# Patient Record
Sex: MALE | Race: WHITE | Employment: OTHER | URBAN - METROPOLITAN AREA
[De-identification: names, ages, dates, MRNs, and addresses within clinical notes are randomized per-mention and may not be internally consistent; named-entity substitution may affect disease eponyms.]

---

## 2017-01-06 DIAGNOSIS — E03.9 ACQUIRED HYPOTHYROIDISM: Primary | ICD-10-CM

## 2017-01-09 RX ORDER — LEVOTHYROXINE SODIUM 150 UG/1
TABLET ORAL
Qty: 90 TABLET | Refills: 1 | Status: SHIPPED | OUTPATIENT
Start: 2017-01-09 | End: 2017-07-05

## 2017-01-09 NOTE — TELEPHONE ENCOUNTER
LEVOTHYROXINE 0.150MG (150MCG) TAB    Last Written Prescription Date: 01/12/2016  Last Quantity: 90, # refills: 1  Last Office Visit with [facility], [facility] or [facility] prescribing provider: 04/07/2016        TSH   Date Value Ref Range Status   04/01/2016 0.59 0.40 - 4.00 mU/L Final

## 2017-01-09 NOTE — TELEPHONE ENCOUNTER
Prescription approved per [facility] Refill Protocol.    Jeane Hernadnez RN

## 2017-05-15 ENCOUNTER — OFFICE VISIT (OUTPATIENT)
Dept: INTERNAL MEDICINE | Facility: CLINIC | Age: 62
End: 2017-05-15
Payer: COMMERCIAL

## 2017-05-15 VITALS
BODY MASS INDEX: 29.7 KG/M2 | HEART RATE: 80 BPM | HEIGHT: 68 IN | WEIGHT: 196 LBS | DIASTOLIC BLOOD PRESSURE: 62 MMHG | SYSTOLIC BLOOD PRESSURE: 132 MMHG | TEMPERATURE: 97.2 F | OXYGEN SATURATION: 97 %

## 2017-05-15 DIAGNOSIS — I77.1 SUBCLAVIAN ARTERY STENOSIS, LEFT (H): Primary | ICD-10-CM

## 2017-05-15 DIAGNOSIS — Z71.89 ADVANCED DIRECTIVES, COUNSELING/DISCUSSION: ICD-10-CM

## 2017-05-15 DIAGNOSIS — M54.2 CERVICALGIA: ICD-10-CM

## 2017-05-15 PROCEDURE — 99213 OFFICE O/P EST LOW 20 MIN: CPT | Performed by: INTERNAL MEDICINE

## 2017-05-15 ASSESSMENT — PAIN SCALES - GENERAL: PAINLEVEL: NO PAIN (0)

## 2017-05-15 NOTE — MR AVS SNAPSHOT
"              After Visit Summary   5/15/2017    Sony Johnson    MRN: [id number]           Patient Information     Date Of Birth          1955        Visit Information        Provider Department      5/15/2017 1:20 PM Parth Echeverria DO [facility]        Today's Diagnoses     Subclavian artery stenosis, left (H)    -  1    Cervicalgia        Advanced directives, counseling/discussion           Follow-ups after your visit        Who to contact     If you have questions or need follow up information about today's clinic visit or your schedule please contact [facility] directly at [phone].  Normal or non-critical lab and imaging results will be communicated to you by Spiced Bitshart, letter or phone within 4 business days after the clinic has received the results. If you do not hear from us within 7 days, please contact the clinic through Spiced Bitshart or phone. If you have a critical or abnormal lab result, we will notify you by phone as soon as possible.  Submit refill requests through Think[employer] or call your pharmacy and they will forward the refill request to us. Please allow 3 business days for your refill to be completed.          Additional Information About Your Visit        MyChart Information     Thinkature lets you send messages to your doctor, view your test results, renew your prescriptions, schedule appointments and more. To sign up, go to [email or web address] . Click on \"Log in\" on the left side of the screen, which will take you to the Welcome page. Then click on \"Sign up Now\" on the right side of the page.     You will be asked to enter the access code listed below, as well as some personal information. Please follow the directions to create your username and password.     Your access code is: [email or web address]  Expires: 2017  1:58 PM     Your access code will  in 90 days. If you need help or a new code, please call your [facility] or [phone].   " "     Care EveryWhere ID     This is your Care EveryWhere ID. This could be used by other organizations to access your [place] medical records  [id number]        Your Vitals Were     Pulse Temperature Height Pulse Oximetry BMI (Body Mass Index)       80 97.2  F (36.2  C) (Temporal) 5' 8\" (1.727 m) 97% 29.8 kg/m2        Blood Pressure from Last 3 Encounters:   05/15/17 132/62   04/07/16 (!) 88/62   04/01/16 90/52    Weight from Last 3 Encounters:   05/15/17 196 lb (88.9 kg)   04/07/16 202 lb 6.4 oz (91.8 kg)   04/01/16 199 lb (90.3 kg)              Today, you had the following     No orders found for display       Primary Care Provider Office Phone # Fax #    Ruperto Courtney [phone]       [place] [id number] [place] [address]        Thank you!     Thank you for choosing [facility]  for your care. Our goal is always to provide you with excellent care. Hearing back from our patients is one way we can continue to improve our services. Please take a few minutes to complete the written survey that you may receive in the mail after your visit with us. Thank you!             Your Updated Medication List - Protect others around you: Learn how to safely use, store and throw away your medicines at www.disposemymeds.org.          This list is accurate as of: 5/15/17  1:58 PM.  Always use your most recent med list.                   Brand Name Dispense Instructions for use    ASPIRIN PO      Take 81 mg by mouth daily       levothyroxine 150 MCG tablet    SYNTHROID/LEVOTHROID    90 tablet    TAKE 1 TABLET BY MOUTH DAILY         "

## 2017-05-15 NOTE — PROGRESS NOTES
SUBJECTIVE:                                                    Sony Johnson is a 61 year old male who presents to clinic today for the following health issues:    Chief Complaint   Patient presents with     Dizziness     f/u         CHIEF COMPLAINT:    The patient is a pleasant 61-year-old gentleman who I saw last year. He notes that he has been doing quite well since then but has recently developed some intermittent vertigo symptoms with some tingling on the side of his tongue and some mild facial paresthesias on the left. He states that he's had these in the past and generally does self manipulation of his neck to make it go away. He states that this works so well that he has required no healthcare. He does have a history of subclavian artery syndrome and does follow by both neurology and vascular surgery. He does not wish to take any medications and have explained that there really is no medication make this better. He states that he is currently symptom-free but it does come and go depending upon his level of self (chiropractic) manipulation. We have discussed the benefits of seeing an actual professional chiropractor and the safety involved with this as well. Given his history of vascular disease, I don't think it's a good idea for him to just go randomly cranking on his neck. He does agree.                         PAST, FAMILY,SOCIAL HISTORY:     Medical  History:   has no past medical history on file.     Surgical History:   has no past surgical history on file.     Social History:   reports that he has been smoking Cigarettes.  He has a 11.00 pack-year smoking history. He has never used smokeless tobacco. He reports that he does not drink alcohol or use illicit drugs.     Family History:  family history is not on file.            MEDICATIONS  Current Outpatient Prescriptions   Medication Sig Dispense Refill     levothyroxine (SYNTHROID/LEVOTHROID) 150 MCG tablet TAKE 1 TABLET BY MOUTH DAILY 90 tablet 1  "    ASPIRIN PO Take 81 mg by mouth daily           --------------------------------------------------------------------------------------------------------------------                          REVIEW OF SYSTEMS:         LUNGS: Pt denies: cough,excess sputum, hemoptysis, or shortness of breath.   HEART: Pt denies: chest pain, arrythmia, syncope, tachy or bradyarrhythmia or excess edema.   GI: Pt denies: nausea, vomitting, diarrhea, constipation, melena, or hematochezia.   NEURO: Pt denies: seizures, strokes, diplopia, weakness, paraesthesias, or paralysis. Does occasionally have vertigo which is not present at this time. Also has occasional paresthesias on the left side of the face and tingling on the left side of the tongue.                          EXAMINATION:         /62 (BP Location: Right arm, Patient Position: Chair, Cuff Size: Adult Regular)  Pulse 80  Temp 97.2  F (36.2  C) (Temporal)  Ht 5' 8\" (1.727 m)  Wt 196 lb (88.9 kg)  SpO2 97%  BMI 29.8 kg/m2   Constitutional: The patient appears to be in no acute distress. The patient appears to be adequately hydrated. No acute respiratory or hemodynamic distress is noted at this time.   LUNGS: clear bilaterally, airflow is brisk, no intercostal retraction or stridor is noted. No coughing is noted during visit.   HEART:  regular without rubs, clicks, gallops, or murmurs. PMI is nondisplaced. Upstrokes are brisk. S1,S2 are heard.   GI: Abdomen is soft, without rebound, guarding or tenderness. Bowel sounds are appropriate. No renal bruits are heard.    NEURO: Pt is alert and appropriate. No neurologic lateralization is noted. Cranial nerves 2-12 are intact. Peripheral sensory and motor function are grossly normal                        DECISION MAKIN. Subclavian artery stenosis, left (H)  Follow-up with vascular surgery as scheduled    2. Cervicalgia  Recommend chiropractic evaluation  Professional card given to patient to schedule " appointment    3. Advanced directives, counseling/discussion  Discussed briefly. Patient not interested                               FOLLOW UP    I have asked the patient to make an appointment for follow up with me for full physical examination in the near future.        I have carefully explained the diagnosis and treatment options with the patient. The patient has displayed an understanding of the above, and all subsequent questions were answered.         DO JULES Garcia    Portions of this note were produced using [employer]  Although every attempt at real-time proof reading has been made, occasional grammar/syntax errors may have been missed.

## 2017-05-15 NOTE — NURSING NOTE
"Chief Complaint   Patient presents with     Dizziness     f/u       Initial /62 (BP Location: Right arm, Patient Position: Chair, Cuff Size: Adult Regular)  Pulse 80  Temp 97.2  F (36.2  C) (Temporal)  Ht 5' 8\" (1.727 m)  Wt 196 lb (88.9 kg)  SpO2 97%  BMI 29.8 kg/m2 Estimated body mass index is 29.8 kg/(m^2) as calculated from the following:    Height as of this encounter: 5' 8\" (1.727 m).    Weight as of this encounter: 196 lb (88.9 kg).  Medication Reconciliation: complete   Health Maintenance reviewed at today's visit patient asked to schedule/complete:   Colon Cancer:  Patient reports already performed at [facility] on 9/2008   Peggy TYSON  '    "

## 2017-07-29 ENCOUNTER — TELEPHONE (OUTPATIENT)
Dept: FAMILY MEDICINE | Facility: CLINIC | Age: 62
End: 2017-07-29

## 2017-07-29 DIAGNOSIS — E03.9 ACQUIRED HYPOTHYROIDISM: ICD-10-CM

## 2017-08-01 RX ORDER — LEVOTHYROXINE SODIUM 150 UG/1
TABLET ORAL
Qty: 30 TABLET | Refills: 0 | Status: SHIPPED | OUTPATIENT
Start: 2017-08-01 | End: 2017-08-06

## 2017-08-04 DIAGNOSIS — E03.9 ACQUIRED HYPOTHYROIDISM: ICD-10-CM

## 2017-08-04 LAB — TSH SERPL DL<=0.005 MIU/L-ACNC: 0.11 MU/L (ref 0.4–4)

## 2017-08-04 PROCEDURE — 36415 COLL VENOUS BLD VENIPUNCTURE: CPT | Performed by: INTERNAL MEDICINE

## 2017-08-04 PROCEDURE — 84443 ASSAY THYROID STIM HORMONE: CPT | Performed by: INTERNAL MEDICINE

## 2017-08-06 RX ORDER — LEVOTHYROXINE SODIUM 137 UG/1
137 TABLET ORAL DAILY
Qty: 90 TABLET | Refills: 0 | Status: SHIPPED | OUTPATIENT
Start: 2017-08-06 | End: 2017-12-04

## 2017-08-07 NOTE — PROGRESS NOTES
Please contact the patient and notify him of the following:  The thyroid appears to be a little high.  Would recommend decreasing the dose of Synthroid to 137 mg at the next refill.    Thank you.  DO JULES Garcia

## 2017-09-09 DIAGNOSIS — E03.9 ACQUIRED HYPOTHYROIDISM: ICD-10-CM

## 2017-09-11 NOTE — TELEPHONE ENCOUNTER
Levothyroxine     Last Written Prescription Date: 8/6/17  Last Quantity: 90, # refills: 0  Last Office Visit with G, P or [facility] prescribing provider: 5/15/17        TSH   Date Value Ref Range Status   08/04/2017 0.11 (L) 0.40 - 4.00 mU/L Final

## 2017-09-12 NOTE — TELEPHONE ENCOUNTER
Routing refill request to provider for review/approval because:  Labs out of range:  TSH.     PCP is currently out of office, will route to covering provider.     Jeane Hernandez RN

## 2017-09-14 RX ORDER — LEVOTHYROXINE SODIUM 150 UG/1
TABLET ORAL
Qty: 30 TABLET | Refills: 0 | OUTPATIENT
Start: 2017-09-14

## 2017-11-09 ENCOUNTER — OFFICE VISIT (OUTPATIENT)
Dept: INTERNAL MEDICINE | Facility: CLINIC | Age: 62
End: 2017-11-09
Payer: COMMERCIAL

## 2017-11-09 VITALS
OXYGEN SATURATION: 98 % | HEART RATE: 60 BPM | BODY MASS INDEX: 30.87 KG/M2 | WEIGHT: 203 LBS | TEMPERATURE: 97.2 F | DIASTOLIC BLOOD PRESSURE: 62 MMHG | SYSTOLIC BLOOD PRESSURE: 112 MMHG

## 2017-11-09 DIAGNOSIS — R51.9 TEMPORAL PAIN: Primary | ICD-10-CM

## 2017-11-09 LAB — ERYTHROCYTE [SEDIMENTATION RATE] IN BLOOD BY WESTERGREN METHOD: 6 MM/H (ref 0–20)

## 2017-11-09 PROCEDURE — 36415 COLL VENOUS BLD VENIPUNCTURE: CPT | Performed by: INTERNAL MEDICINE

## 2017-11-09 PROCEDURE — 85652 RBC SED RATE AUTOMATED: CPT | Performed by: INTERNAL MEDICINE

## 2017-11-09 PROCEDURE — 99213 OFFICE O/P EST LOW 20 MIN: CPT | Performed by: INTERNAL MEDICINE

## 2017-11-09 RX ORDER — GABAPENTIN 300 MG/1
300 CAPSULE ORAL 3 TIMES DAILY
Qty: 14 CAPSULE | Refills: 0 | Status: SHIPPED | OUTPATIENT
Start: 2017-11-09 | End: 2017-11-15

## 2017-11-09 ASSESSMENT — PAIN SCALES - GENERAL: PAINLEVEL: NO PAIN (1)

## 2017-11-09 NOTE — MR AVS SNAPSHOT
"              After Visit Summary   11/9/2017    Sony Johnson    MRN: [id number]           Patient Information     Date Of Birth          1955        Visit Information        Provider Department      11/9/2017 2:20 PM Parth Echeverria DO [facility]        Today's Diagnoses     Temporal pain    -  1       Follow-ups after your visit        Your next 10 appointments already scheduled     Nov 30, 2017  2:20 PM CST   Office Visit with Parth Echeverria DO   [facility]    [address] [place] [address] [id number] [phone]           Bring a current list of meds and any records pertaining to this visit. For Physicals, please bring immunization records and any forms needing to be filled out. Please arrive 10 minutes early to complete paperwork.              Who to contact     If you have questions or need follow up information about today's clinic visit or your schedule please contact [facility] directly at [phone].  Normal or non-critical lab and imaging results will be communicated to you by Circle of Momshart, letter or phone within 4 business days after the clinic has received the results. If you do not hear from us within 7 days, please contact the clinic through [facility] or phone. If you have a critical or abnormal lab result, we will notify you by phone as soon as possible.  Submit refill requests through [employer] or call your pharmacy and they will forward the refill request to us. Please allow 3 business days for your refill to be completed.          Additional Information About Your Visit        Circle of Momshar[employer] Savvy Information     [employer] lets you send messages to your doctor, view your test results, renew your prescriptions, schedule appointments and more. To sign up, go to [email or web address] . Click on \"Log in\" on the left side of the screen, which will take you to the Welcome page. Then click on \"Sign up Now\" on " the right side of the page.     You will be asked to enter the access code listed below, as well as some personal information. Please follow the directions to create your username and password.     Your access code is: [email or web address]  Expires: 2018  4:11 PM     Your access code will  in 90 days. If you need help or a new code, please call your [facility] or [phone].        Care EveryWhere ID     This is your Care EveryWhere ID. This could be used by other organizations to access your [place] medical records  [id number]        Your Vitals Were     Pulse Temperature Pulse Oximetry BMI (Body Mass Index)          60 97.2  F (36.2  C) (Temporal) 98% 30.87 kg/m2         Blood Pressure from Last 3 Encounters:   17 112/62   05/15/17 132/62   16 (!) 88/62    Weight from Last 3 Encounters:   17 203 lb (92.1 kg)   05/15/17 196 lb (88.9 kg)   16 202 lb 6.4 oz (91.8 kg)              We Performed the Following     ESR: Erythrocyte sedimentation rate          Today's Medication Changes          These changes are accurate as of: 17 11:59 PM.  If you have any questions, ask your nurse or doctor.               Start taking these medicines.        Dose/Directions    gabapentin 300 MG capsule   Commonly known as:  NEURONTIN   Started by:  Parth Echeverria DO        Dose:  300 mg   Take 1 capsule (300 mg) by mouth 3 times daily   Quantity:  14 capsule   Refills:  0            Where to get your medicines      These medications were sent to [facility] [address] [address] [facility] [address]  [address] [facility], [place] [facility] [address]     Phone:  [phone]     gabapentin 300 MG capsule                Primary Care Provider Office Phone # Fax #    Parth Echeverria [phone] [address] [place] [address]        Equal Access to Services     [facility] SHELBI AH: juan Hay  martha cam waxngoc naeanuja hernandezwendy juan. So [facility] [phone].    ATENCIÓN: Si nancy mahoney, tiene a bonilla disposición servicios gratuitos de asistencia lingüística. Llame al [phone].    We comply with applicable federal civil rights laws and [place] laws. We do not discriminate on the basis of race, color, national origin, age, disability, sex, sexual orientation, or gender identity.            Thank you!     Thank you for choosing [facility]  for your care. Our goal is always to provide you with excellent care. Hearing back from our patients is one way we can continue to improve our services. Please take a few minutes to complete the written survey that you may receive in the mail after your visit with us. Thank you!             Your Updated Medication List - Protect others around you: Learn how to safely use, store and throw away your medicines at www.disposemymeds.org.          This list is accurate as of: 11/9/17 11:59 PM.  Always use your most recent med list.                   Brand Name Dispense Instructions for use Diagnosis    ASPIRIN PO      Take 81 mg by mouth daily        gabapentin 300 MG capsule    NEURONTIN    14 capsule    Take 1 capsule (300 mg) by mouth 3 times daily        levothyroxine 137 MCG tablet    SYNTHROID/LEVOTHROID    90 tablet    Take 1 tablet (137 mcg) by mouth daily    Acquired hypothyroidism

## 2017-11-09 NOTE — PROGRESS NOTES
Chief Complaint   Patient presents with     Headache     CHIEF COMPLAINT:    The patient is a pleasant 61-year-old gentleman who presents today complaining of headache. He's had this off and on now for the last couple weeks. It is a minimal superficial headache over the right temporal area with occasional sharp stabbing twinges.these only last about a minute or so and evidently resolved. He's had no associated visual changes, fevers or chills. He notes no trauma to the head. He notes no neurologic sequelae including memory loss etc. Does have a history of hypothyroidism and takes his medication compliantly.                         PAST, FAMILY,SOCIAL HISTORY:     Medical  History:   has no past medical history on file.     Surgical History:   has no past surgical history on file.     Social History:   reports that he has been smoking Cigarettes.  He has a 11.00 pack-year smoking history. He has never used smokeless tobacco. He reports that he does not drink alcohol or use illicit drugs.     Family History:  family history is not on file.            MEDICATIONS  Current Outpatient Prescriptions   Medication Sig Dispense Refill     gabapentin (NEURONTIN) 300 MG capsule Take 1 capsule (300 mg) by mouth 3 times daily 14 capsule 0     levothyroxine (SYNTHROID/LEVOTHROID) 137 MCG tablet Take 1 tablet (137 mcg) by mouth daily 90 tablet 0     ASPIRIN PO Take 81 mg by mouth daily           --------------------------------------------------------------------------------------------------------------------                          REVIEW OF SYSTEMS:         LUNGS: Pt denies: cough,excess sputum, hemoptysis, or shortness of breath.   HEART: Pt denies: chest pain, arrythmia, syncope, tachy or bradyarrhythmia or excess edema.   GI: Pt denies: nausea, vomitting, diarrhea, constipation, melena, or hematochezia.   NEURO: Pt denies: seizures, strokes, diplopia, weakness, paraesthesias, or paralysis.does have some mild hyperesthesia  over the right temporal area as well.   SKIN: Pt denies: itching, rashes, discoloration, or specific lesions of concern. Denies recent hair loss.   EYES: Pt denies: double vision, blurred vision, scotomas, or eye pain. Denies any loss of vision in the right eye                          EXAMINATION:         /62 (BP Location: Left arm, Patient Position: Chair, Cuff Size: Adult Regular)  Pulse 60  Temp 97.2  F (36.2  C) (Temporal)  Wt 203 lb (92.1 kg)  SpO2 98%  BMI 30.87 kg/m2   LUNGS: clear bilaterally, airflow is brisk, no intercostal retraction or stridor is noted. No coughing is noted during visit.   HEART:  regular without rubs, clicks, gallops, or murmurs. PMI is nondisplaced. Upstrokes are brisk. S1,S2 are heard. There is no ropiness or fullness to the temporal artery on palpation.   GI: Abdomen is soft, without rebound, guarding or tenderness. Bowel sounds are appropriate. No renal bruits are heard.    NEURO: Pt is alert and appropriate. No neurologic lateralization is noted. Cranial nerves 2-12 are intact. Peripheral sensory and motor function are grossly normal.ssome hyperesthesia over the right temporal area is noted.   EYES: Pupils are equal, round and reactive. No significant ptosis, conjunctivitis, or inflammation. Fundal exam is grossly normal. Vision is grossly intact bilaterally.  Recommend yearly eye exams with appropriate occular professional.                        DECISION MAKIN. Temporal pain  Rule out temporal arteritis versus mild trigeminal neuritis/neuralgia  - ESR: Erythrocyte sedimentation rate  Will start gabapentin at 300 mg moving up toward 3 times daily    If sedimentation rate comes back elevated, will be starting steroid burst with ultimate taper and set up for temporal artery biopsy.                               FOLLOW UP    I have asked the patient to make an appointment for follow up with me in 1-2 weeks pending upon re        I have carefully explained the  diagnosis and treatment options with the patient. The patient has displayed an understanding of the above, and all subsequent questions were answered.         DO JULES Garcia    Portions of this note were produced using Ex[employer]bit  Although every attempt at real-time proof reading has been made, occasional grammar/syntax errors may have been missed.

## 2017-11-09 NOTE — NURSING NOTE
"Chief Complaint   Patient presents with     Headache       Initial /62 (BP Location: Left arm, Patient Position: Chair, Cuff Size: Adult Regular)  Pulse 60  Temp 97.2  F (36.2  C) (Temporal)  Wt 203 lb (92.1 kg)  SpO2 98%  BMI 30.87 kg/m2 Estimated body mass index is 30.87 kg/(m^2) as calculated from the following:    Height as of 5/15/17: 5' 8\" (1.727 m).    Weight as of this encounter: 203 lb (92.1 kg).  Medication Reconciliation: complete  Peggy EASONA    "

## 2017-11-15 ENCOUNTER — TELEPHONE (OUTPATIENT)
Dept: FAMILY MEDICINE | Facility: CLINIC | Age: 62
End: 2017-11-15

## 2017-11-15 DIAGNOSIS — G50.8 TRIGEMINAL NEURITIS: Primary | ICD-10-CM

## 2017-11-15 RX ORDER — GABAPENTIN 300 MG/1
CAPSULE ORAL
Qty: 120 CAPSULE | Refills: 0 | Status: SHIPPED | OUTPATIENT
Start: 2017-11-15 | End: 2018-03-14

## 2017-11-15 NOTE — TELEPHONE ENCOUNTER
----- Message from Parth Echeverria DO sent at 11/15/2017  7:55 AM CST -----    Please contact the patient and notify him of the following:  The sedimentation rate is 6 suggesting minimal inflammation.    Thank you.  DO HEYDI GarciaOI

## 2017-11-15 NOTE — PROGRESS NOTES
Please contact the patient and notify him of the following:  The sedimentation rate is 6 suggesting minimal inflammation.    Thank you.  DO JULES Garcia

## 2017-11-15 NOTE — TELEPHONE ENCOUNTER
Patient returned call. I relayed results message below and he has further questions. Please call him back at your earliest convenience to address.     Thank you  Leland Patel  Patient Representative

## 2017-11-15 NOTE — TELEPHONE ENCOUNTER
The normal sedimentation rate would suggest that we are not looking at a picture of temporal arteritis. Given the superficial neuritis type symptoms that he is having on his right temporal area, I would recommend continue with the gabapentin. We can increase the dosage to 300 mg in the morning, 300 mg at noon, and 600 mg at bedtime. I will call over a prescription to the [facility] pharmacy in [place].    I would like to have him set up a follow-up appointment in about 2 weeks.    Jacki

## 2017-11-15 NOTE — TELEPHONE ENCOUNTER
Pt is questioning what his next step is. Pt advised this would be review by Dr. Echeverria and he would be a call back. Pt was in agreement.

## 2017-11-30 ENCOUNTER — OFFICE VISIT (OUTPATIENT)
Dept: INTERNAL MEDICINE | Facility: CLINIC | Age: 62
End: 2017-11-30
Payer: COMMERCIAL

## 2017-11-30 VITALS
DIASTOLIC BLOOD PRESSURE: 62 MMHG | TEMPERATURE: 96.8 F | HEART RATE: 88 BPM | SYSTOLIC BLOOD PRESSURE: 102 MMHG | OXYGEN SATURATION: 97 % | BODY MASS INDEX: 30.56 KG/M2 | WEIGHT: 201 LBS

## 2017-11-30 DIAGNOSIS — G50.0 TRIGEMINAL NEURALGIA: Primary | ICD-10-CM

## 2017-11-30 PROCEDURE — 99213 OFFICE O/P EST LOW 20 MIN: CPT | Performed by: INTERNAL MEDICINE

## 2017-11-30 ASSESSMENT — PAIN SCALES - GENERAL: PAINLEVEL: NO PAIN (0)

## 2017-11-30 NOTE — PROGRESS NOTES
Chief Complaint   Patient presents with     RECHECK                    Chief Complaint    The patient is a pleasant 62-year-old gentleman who recently had some temporal discomfort.  He was started on gabapentin and sedimentation rate was performed to rule out temporal arteritis.  Sed rate returned as normal and he has had near complete resolution of his discomfort with the use of the gabapentin.  He is having no side effects from the medication.                       PAST, FAMILY,SOCIAL HISTORY:     Medical  History:   has no past medical history on file.     Surgical History:   has no past surgical history on file.     Social History:   reports that he has been smoking Cigarettes.  He has a 11.00 pack-year smoking history. He has never used smokeless tobacco. He reports that he does not drink alcohol or use illicit drugs.     Family History:  family history is not on file.            MEDICATIONS  Current Outpatient Prescriptions   Medication Sig Dispense Refill     gabapentin (NEURONTIN) 300 MG capsule 1 capsule in the morning, 1 capsule at noon, and 2 capsules at bedtime. 120 capsule 0     ASPIRIN PO Take 81 mg by mouth daily       levothyroxine (SYNTHROID/LEVOTHROID) 137 MCG tablet TAKE 1 TABLET(137 MCG) BY MOUTH DAILY 90 tablet 1         --------------------------------------------------------------------------------------------------------------------                              Review of Systems     LUNGS: Pt denies: cough,excess sputum, hemoptysis, or shortness of breath.   HEART: Pt denies: chest pain, arrythmia, syncope, tachy or bradyarrhythmia.   GI: Pt denies: nausea, vomitting, diarrhea, constipation, melena, or hematochezia.   NEURO: Pt denies: seizures, strokes, diplopia, weakness, paraesthesias, or paralysis.  Marked improvement in the superficial temporal pain along the right side of his right temporal area is noted.                                     Examination  /62 (BP Location: Right  arm, Patient Position: Chair, Cuff Size: Adult Regular)  Pulse 88  Temp 96.8  F (36  C) (Temporal)  Wt 201 lb (91.2 kg)  SpO2 97%  BMI 30.56 kg/m2   Constitutional: The patient appears to be in no acute distress. The patient appears to be adequately hydrated. No acute respiratory or hemodynamic distress is noted at this time.   LUNGS: clear bilaterally, airflow is brisk, no intercostal retraction or stridor is noted. No coughing is noted during visit.   HEART:  regular without rubs, clicks, gallops, or murmurs. PMI is nondisplaced. Upstrokes are brisk. S1,S2 are heard.   GI: Abdomen is soft, without rebound, guarding or tenderness. Bowel sounds are appropriate. No renal bruits are heard.   SKIN:  warm and dry. No erythema, or rashes are noted. No specific lesions of concern are noted.  No evidence of zoster is noted.                                        Decision Making      1. Trigeminal neuralgia  Recommend continuing the gabapentin for a month and then slowly tapering off of it.  I suspect this trigeminal neuralgia involving the first branch of the trigeminal nerve will resolve spontaneously.                        FOLLOW UP   I have asked the patient to make an appointment for followup with me as needed        I have carefully explained the diagnosis and treatment options to the patient.  The patient has displayed an understanding of the above, and all subsequent questions were answered.      DO JULES Garcia    Portions of this note were produced using Instant[employer]  Although every attempt at real-time proof reading has been made, occasional grammar/syntax errors may have been missed.

## 2017-11-30 NOTE — MR AVS SNAPSHOT
"              After Visit Summary   2017    Sony Johnson    MRN: [id number]           Patient Information     Date Of Birth          1955        Visit Information        Provider Department      2017 2:20 PM Parth Echeverria DO [facility]        Today's Diagnoses     Trigeminal neuralgia    -  1       Follow-ups after your visit        Who to contact     If you have questions or need follow up information about today's clinic visit or your schedule please contact [facility] directly at [phone].  Normal or non-critical lab and imaging results will be communicated to you by [employer], letter or phone within 4 business days after the clinic has received the results. If you do not hear from us within 7 days, please contact the clinic through [employer] or phone. If you have a critical or abnormal lab result, we will notify you by phone as soon as possible.  Submit refill requests through [facility] or call your pharmacy and they will forward the refill request to us. Please allow 3 business days for your refill to be completed.          Additional Information About Your Visit        MyChart Information     [facility] lets you send messages to your doctor, view your test results, renew your prescriptions, schedule appointments and more. To sign up, go to [email or web address] . Click on \"Log in\" on the left side of the screen, which will take you to the Welcome page. Then click on \"Sign up Now\" on the right side of the page.     You will be asked to enter the access code listed below, as well as some personal information. Please follow the directions to create your username and password.     Your access code is: [email or web address]  Expires: 2018  4:11 PM     Your access code will  in 90 days. If you need help or a new code, please call your [facility] or [phone].        Care EveryWhere ID     This is your Care EveryWhere ID. This could be used by " other organizations to access your [facility] medical records  [id number]        Your Vitals Were     Pulse Temperature Pulse Oximetry BMI (Body Mass Index)          88 96.8  F (36  C) (Temporal) 97% 30.56 kg/m2         Blood Pressure from Last 3 Encounters:   11/30/17 102/62   11/09/17 112/62   05/15/17 132/62    Weight from Last 3 Encounters:   11/30/17 201 lb (91.2 kg)   11/09/17 203 lb (92.1 kg)   05/15/17 196 lb (88.9 kg)              Today, you had the following     No orders found for display       Primary Care Provider Office Phone # Fax #    Parth Jhonatan Echeverria,  [phone]       [address] [place] [address]        Equal Access to Services     REI MAIRO : Hadii aad ku hadashguanakito Sokaterin, waaxda luqadaha, qaybta kaalmada adeegyada, kalyan payan . So [facility] [phone].    ATENCIÓN: Si habla español, tiene a bonilla disposición servicios gratuitos de asistencia lingüística. Llame al [phone].    We comply with applicable federal civil rights laws and [place] laws. We do not discriminate on the basis of race, color, national origin, age, disability, sex, sexual orientation, or gender identity.            Thank you!     Thank you for choosing [facility]  for your care. Our goal is always to provide you with excellent care. Hearing back from our patients is one way we can continue to improve our services. Please take a few minutes to complete the written survey that you may receive in the mail after your visit with us. Thank you!             Your Updated Medication List - Protect others around you: Learn how to safely use, store and throw away your medicines at www.disposemymeds.org.          This list is accurate as of: 11/30/17 11:59 PM.  Always use your most recent med list.                   Brand Name Dispense Instructions for use Diagnosis    ASPIRIN PO      Take 81 mg by mouth daily        gabapentin 300 MG capsule    NEURONTIN     120 capsule    1 capsule in the morning, 1 capsule at noon, and 2 capsules at bedtime.    Trigeminal neuritis

## 2017-11-30 NOTE — NURSING NOTE
"Chief Complaint   Patient presents with     RECHECK       Initial /62 (BP Location: Right arm, Patient Position: Chair, Cuff Size: Adult Regular)  Pulse 88  Temp 96.8  F (36  C) (Temporal)  Wt 201 lb (91.2 kg)  SpO2 97%  BMI 30.56 kg/m2 Estimated body mass index is 30.56 kg/(m^2) as calculated from the following:    Height as of 5/15/17: 5' 8\" (1.727 m).    Weight as of this encounter: 201 lb (91.2 kg).  Medication Reconciliation: complete  Peggy TYSON    "

## 2017-12-04 ENCOUNTER — TELEPHONE (OUTPATIENT)
Dept: LAB | Facility: CLINIC | Age: 62
End: 2017-12-04

## 2017-12-04 DIAGNOSIS — E03.9 ACQUIRED HYPOTHYROIDISM: ICD-10-CM

## 2017-12-04 NOTE — TELEPHONE ENCOUNTER
Requested Prescriptions   Pending Prescriptions Disp Refills     levothyroxine (SYNTHROID/LEVOTHROID) 137 MCG tablet [Pharmacy Med Name: LEVOTHYROXINE 0.137MG (137MCG) TAB] 90 tablet 0     Sig: TAKE 1 TABLET(137 MCG) BY MOUTH DAILY    Thyroid Protocol Failed    12/4/2017  2:32 PM       Failed - Normal TSH on file in past 12 months    Recent Labs   Lab Test  08/04/17   1228   TSH  0.11*             Passed - Patient is 12 years or older       Passed - Recent or future visit with authorizing provider's specialty    Patient had office visit in the last year or has a visit in the next 30 days with authorizing provider.  See chart review.

## 2017-12-05 RX ORDER — LEVOTHYROXINE SODIUM 137 UG/1
TABLET ORAL
Qty: 90 TABLET | Refills: 1 | Status: SHIPPED | OUTPATIENT
Start: 2017-12-05 | End: 2018-06-04

## 2017-12-05 NOTE — TELEPHONE ENCOUNTER
Routing refill request to provider for review/approval because:  Labs out of range:  TSH    Diane Irvin, RN  [facility]

## 2017-12-07 NOTE — TELEPHONE ENCOUNTER
"Patient called and requested to speak with PCP, patient was advised a message could be sent and then patient proceeded to state he \"doesn't understand why there is a hold on his medication as he needs to take one every dam day of his life.\"  Patient went on and on and said \"he'd just dig a hole and roll into as that's obviously what everyone here wants him to do\", then patient hung up before I could get any words out.    Thank you,  Mindy Mcneal  Patient Representative    "

## 2018-03-14 ENCOUNTER — APPOINTMENT (OUTPATIENT)
Dept: GENERAL RADIOLOGY | Facility: CLINIC | Age: 63
End: 2018-03-14
Attending: EMERGENCY MEDICINE
Payer: COMMERCIAL

## 2018-03-14 ENCOUNTER — HOSPITAL ENCOUNTER (EMERGENCY)
Facility: CLINIC | Age: 63
Discharge: HOME | End: 2018-03-14
Attending: EMERGENCY MEDICINE | Admitting: EMERGENCY MEDICINE
Payer: COMMERCIAL

## 2018-03-14 ENCOUNTER — NURSE TRIAGE (OUTPATIENT)
Dept: NURSING | Facility: CLINIC | Age: 63
End: 2018-03-14

## 2018-03-14 VITALS
SYSTOLIC BLOOD PRESSURE: 125 MMHG | DIASTOLIC BLOOD PRESSURE: 78 MMHG | TEMPERATURE: 96.8 F | RESPIRATION RATE: 16 BRPM | OXYGEN SATURATION: 100 %

## 2018-03-14 DIAGNOSIS — Z72.0 TOBACCO ABUSE: ICD-10-CM

## 2018-03-14 DIAGNOSIS — J11.1 INFLUENZA-LIKE ILLNESS: ICD-10-CM

## 2018-03-14 PROCEDURE — 71046 X-RAY EXAM CHEST 2 VIEWS: CPT | Mod: TC

## 2018-03-14 PROCEDURE — 93010 ELECTROCARDIOGRAM REPORT: CPT | Mod: Z6 | Performed by: EMERGENCY MEDICINE

## 2018-03-14 PROCEDURE — 99284 EMERGENCY DEPT VISIT MOD MDM: CPT | Mod: 25 | Performed by: EMERGENCY MEDICINE

## 2018-03-14 PROCEDURE — 93005 ELECTROCARDIOGRAM TRACING: CPT | Performed by: EMERGENCY MEDICINE

## 2018-03-14 PROCEDURE — 99285 EMERGENCY DEPT VISIT HI MDM: CPT | Mod: 25 | Performed by: EMERGENCY MEDICINE

## 2018-03-14 RX ORDER — ALBUTEROL SULFATE 90 UG/1
2 AEROSOL, METERED RESPIRATORY (INHALATION) EVERY 6 HOURS
Qty: 1 INHALER | Refills: 0 | Status: SHIPPED | OUTPATIENT
Start: 2018-03-14 | End: 2018-03-24

## 2018-03-14 RX ORDER — BENZONATATE 200 MG/1
200 CAPSULE ORAL 3 TIMES DAILY PRN
Qty: 20 CAPSULE | Refills: 0 | Status: SHIPPED | OUTPATIENT
Start: 2018-03-14 | End: 2020-01-25

## 2018-03-14 NOTE — ED AVS SNAPSHOT
[facility] Emergency Department    [address] [place] [address]    Phone:  [phone]    Fax:  [phone]                                       Sony Johnson   MRN: [id number]    Department:  [facility] Emergency Department   Date of Visit:  3/14/2018           Patient Information     Date Of Birth          1955        Your diagnoses for this visit were:     Influenza-like illness     Tobacco abuse        You were seen by Jesus Mancia MD.      Follow-up Information     Follow up with Parth Echeverria DO.    Specialty:  Internal Medicine    Why:  As needed    Contact information:    [address] [place] [id number] [phone]        Discharge References/Attachments     SMOKING CESSATION (ENGLISH)    (INFLUENZA), THE FLU (ENGLISH)      24 Hour Appointment Hotline       To make an appointment at any [facility], call [phone] ([phone]). If you don't have a family doctor or clinic, we will help you find one. [facility] clinics are conveniently located to serve the needs of you and your family.             Review of your medicines      START taking        Dose / Directions Last dose taken    albuterol 108 (90 BASE) MCG/ACT Inhaler   Commonly known as:  PROAIR HFA/PROVENTIL HFA/VENTOLIN HFA   Dose:  2 puff   Quantity:  1 Inhaler        Inhale 2 puffs into the lungs every 6 hours for 10 days   Refills:  0        benzonatate 200 MG capsule   Commonly known as:  TESSALON   Dose:  200 mg   Quantity:  20 capsule        Take 1 capsule (200 mg) by mouth 3 times daily as needed for cough   Refills:  0          Our records show that you are taking the medicines listed below. If these are incorrect, please call your family doctor or clinic.        Dose / Directions Last dose taken    ASPIRIN PO   Dose:  81 mg        Take 81 mg by mouth daily   Refills:  0        levothyroxine 137 MCG tablet   Commonly known as:  SYNTHROID/LEVOTHROID   Quantity:  90 tablet  "       TAKE 1 TABLET(137 MCG) BY MOUTH DAILY   Refills:  1                Prescriptions were sent or printed at these locations (2 Prescriptions)                   [facility], [place] - [address]   [address] [facility] [address]    Telephone:  [phone]   Fax:  [phone]   Hours:                  E-Prescribed (2 of 2)         benzonatate (TESSALON) 200 MG capsule               albuterol (PROAIR HFA/PROVENTIL HFA/VENTOLIN HFA) 108 (90 BASE) MCG/ACT Inhaler                Procedures and tests performed during your visit     EKG 12-lead, tracing only    XR Chest 2 Views      Orders Needing Specimen Collection     None      Pending Results     No orders found from 3/12/2018 to 3/15/2018.            Pending Culture Results     No orders found from 3/12/2018 to 3/15/2018.            Pending Results Instructions     If you had any lab results that were not finalized at the time of your Discharge, you can call the ED Lab Result RN at [phone]. You will be contacted by this team for any positive Lab results or changes in treatment. The nurses are available 7 days a week from 10A to 6:30P.  You can leave a message 24 hours per day and they will return your call.        Thank you for choosing [facility]       Thank you for choosing [facility] for your care. Our goal is always to provide you with excellent care. Hearing back from our patients is one way we can continue to improve our services. Please take a few minutes to complete the written survey that you may receive in the mail after you visit with us. Thank you!        [facility] Information     [facility] lets you send messages to your doctor, view your test results, renew your prescriptions, schedule appointments and more. To sign up, go to [email or web address] . Click on \"Log in\" on the left side of the screen, which will take you to the Welcome page. Then click on \"Sign up Now\" on the right side of the page.     You will be " asked to enter the access code listed below, as well as some personal information. Please follow the directions to create your username and password.     Your access code is: [email or web address]  Expires: 2018  3:55 PM     Your access code will  in 90 days. If you need help or a new code, please call your [facility] clinic or [phone].        Care EveryWhere ID     This is your Care EveryWhere ID. This could be used by other organizations to access your [facility] medical records  [id number]        Equal Access to Services     [facility] : Verito Williamson, juan cam, martha león, kalyan payan . So [facility] [phone].    ATENCIÓN: Si habla español, tiene a bonilla disposición servicios gratuitos de asistencia lingüística. David al [phone].    We comply with applicable federal civil rights laws and [place] laws. We do not discriminate on the basis of race, color, national origin, age, disability, sex, sexual orientation, or gender identity.            After Visit Summary       This is your record. Keep this with you and show to your community pharmacist(s) and doctor(s) at your next visit.

## 2018-03-14 NOTE — ED AVS SNAPSHOT
[facility] Emergency Department    [address] [place] [address]    Phone:  [phone]    Fax:  [phone]                                       Sony Johnson   MRN: [id number]    Department:  [facility] Emergency Department   Date of Visit:  3/14/2018           After Visit Summary Signature Page     I have received my discharge instructions, and my questions have been answered. I have discussed any challenges I see with this plan with the nurse or doctor.    ..........................................................................................................................................  Patient/Patient Representative Signature      ..........................................................................................................................................  Patient Representative Print Name and Relationship to Patient    ..................................................               ................................................  Date                                            Time    ..........................................................................................................................................  Reviewed by Signature/Title    ...................................................              ..............................................  Date                                                            Time

## 2018-03-14 NOTE — TELEPHONE ENCOUNTER
Reason for Disposition    Chest pain  (Exception: MILD central chest pain, present only when coughing)    Additional Information    Negative: Severe difficulty breathing (e.g., struggling for each breath, speaks in single words)    Negative: Bluish lips, tongue, or face now    Negative: Shock suspected (e.g., cold/pale/clammy skin, too weak to stand, low BP, rapid pulse)    Negative: Sounds like a life-threatening emergency to the triager    Negative: Severe sore throat    Negative: [1] Doesn't match the criteria for Influenza AND [2] sounds like a cold    Negative: Influenza vaccine reaction is suspected    Protocols used: INFLUENZA - SEASONAL-ADULT-AH    He has someone who can bring him to the ER for evaluation. He has body aches, coughing hurts in the chest with deep breaths.  He did not get a flu shot.  Yue Esteban RN-[facility]

## 2018-03-14 NOTE — ED PROVIDER NOTES
History     Chief Complaint   Patient presents with     Flu Symptoms     HPI  Sony Johnson is a 62 year old male who presents with 4 days of upper respiratory symptoms including congestion, nonproductive cough, fever, chills and body aches.  Patient states he feels so cold he wraps himself up in a blanket and then starts to sweat.  He then takes a blanket often gets chilled again.  He has had harsh nonproductive cough and complains of lower chest and upper abdominal pain due to coughing.  Pain does not radiate to his back.  His nausea or vomiting.  He has had no change in his stooling pattern.  No leg pain or swelling.  No history of DVT or PE.  Claims history of pneumonia as a child.  Is a daily smoker.  Denies history of COPD, eczema or asthma.  Did not receive influenza immunization this year.  No treatment for symptoms prior to arrival.  No known exposure to influenza.  No recent travel.  Denies personal cardiac history.  Patient wears daily compressive stockings to prevent pooling of blood in his lower extremities as he stands at work all day.    Problem List:    Patient Active Problem List    Diagnosis Date Noted     Advanced directives, counseling/discussion 05/15/2017     Priority: Medium     Info given       Hypothyroidism 02/11/2015     Priority: Medium     CARDIOVASCULAR SCREENING; LDL GOAL LESS THAN 130 02/11/2015     Priority: Medium        Past Medical History:    History reviewed. No pertinent past medical history.    Past Surgical History:    History reviewed. No pertinent surgical history.    Family History:    No family history on file.    Social History:  Marital Status:   [2]  Social History   Substance Use Topics     Smoking status: Current Every Day Smoker     Packs/day: 0.25     Years: 44.00     Types: Cigarettes     Last attempt to quit: 11/16/2015     Smokeless tobacco: Never Used      Comment: started age 16     Alcohol use No        Medications:      benzonatate (TESSALON) 200  MG capsule   albuterol (PROAIR HFA/PROVENTIL HFA/VENTOLIN HFA) 108 (90 BASE) MCG/ACT Inhaler   levothyroxine (SYNTHROID/LEVOTHROID) 137 MCG tablet   ASPIRIN PO         Review of Systems all other systems reviewed and are negative.    Physical Exam   BP: 125/78  Heart Rate: 65  Temp: 96.8  F (36  C)  Resp: 16  SpO2: 100 %      Physical Exam alert cooperative male in mild to moderate distress.  HEENT shows no scleral icterus.  Ears are clear bilaterally.  Nasal passages are boggy with the left side swollen to near occlusion with clear discharge.  Orally he does not have tonsillar hypertrophy.  There is postnasal drip noted.  Neck is supple without limitation.  No stridor.  Lungs reveal rare basilar crackles but no active wheezing.  Cardiac regular rate without murmur.  Chest wall is tender on the lower costal margin bilaterally and reproduces his above-described pain.  Patient's abdomen was benign to palpation without organomegaly, masses, or tenderness.  No leg pain or swelling.  Negative Homans.  Patient is wearing compressive stockings.    ED Course     ED Course     Procedures               EKG Interpretation:      Interpreted by Jesus Mancia  Time reviewed: 15:25  Symptoms at time of EKG: Cough and lower chest pain   Rhythm: normal sinus   Rate: Normal  Axis: Normal  Ectopy: premature atrial contraction  Conduction: nonspecific interventricular conduction block  ST Segments/ T Waves: No acute ischemic changes  Q Waves: none  Comparison to prior: Patient had an EKG done in [place] in 2012.  There is no actual EKG available but descriptions shows he had a fascicular block at that time    Clinical Impression: non-specific EKG with PACs      Results for orders placed or performed during the hospital encounter of 03/14/18   XR Chest 2 Views    Narrative    CHEST TWO VIEWS 3/14/2018 3:35 PM     HISTORY: Shortness of breath and cough.    COMPARISON: 12/18/2015    FINDINGS: Heart size and pulmonary vascularity are  within normal  limits. The lungs are clear. No pneumothorax or pleural effusion.       Impression    IMPRESSION: No radiographic evidence of acute chest abnormality.                Critical Care time:  none               Results for orders placed or performed during the hospital encounter of 03/14/18 (from the past 24 hour(s))   XR Chest 2 Views    Narrative    CHEST TWO VIEWS 3/14/2018 3:35 PM     HISTORY: Shortness of breath and cough.    COMPARISON: 12/18/2015    FINDINGS: Heart size and pulmonary vascularity are within normal  limits. The lungs are clear. No pneumothorax or pleural effusion.       Impression    IMPRESSION: No radiographic evidence of acute chest abnormality.     MERRITT SHOEMAKER MD       Medications - No data to display  EKG is ordered and reviewed as above.  Chest x-rays ordered  Assessments & Plan (with Medical Decision Making)   Patient is a 62-year-old male presents with 4 days of congestion, cough, body aches, fever and chills.  Lower chest pain from coughing.  This is reproducible on exam.  Basilar crackles but no other adventitious lung sounds.  Normal cardiac auscultation.  Benign abdominal exam.  No leg pain or swelling.  Not received influenza immunization this year.  Admits to pneumonia as a child but x-ray shows no acute abnormality.  Suspect he has influenza-like illness.  Patient is vitally stable and not hypoxic.  He was afebrile.  EKG was obtained and showed no acute abnormality and by report is consistent with previous EKG.  He is too far into the course to benefit from Tamiflu.  Provide Tessalon [place] for cough.  Albuterol for shortness of breath or wheezing.  Discussed smoking cessation and information is given regarding this.  He is also given information on influenza-like illness.  Reasons to return to emergency room were discussed.  I have reviewed the nursing notes.    I have reviewed the findings, diagnosis, plan and need for follow up with the patient.       New  Prescriptions    ALBUTEROL (PROAIR HFA/PROVENTIL HFA/VENTOLIN HFA) 108 (90 BASE) MCG/ACT INHALER    Inhale 2 puffs into the lungs every 6 hours for 10 days    BENZONATATE (TESSALON) 200 MG CAPSULE    Take 1 capsule (200 mg) by mouth 3 times daily as needed for cough       Final diagnoses:   Influenza-like illness   Tobacco abuse       3/14/2018   [facility] EMERGENCY DEPARTMENT     Jesus Mancia MD  03/14/18 1596

## 2018-06-04 ENCOUNTER — TELEPHONE (OUTPATIENT)
Dept: INTERNAL MEDICINE | Facility: CLINIC | Age: 63
End: 2018-06-04

## 2018-06-04 DIAGNOSIS — E03.9 ACQUIRED HYPOTHYROIDISM: ICD-10-CM

## 2018-06-04 NOTE — TELEPHONE ENCOUNTER
"Requested Prescriptions   Pending Prescriptions Disp Refills     levothyroxine (SYNTHROID/LEVOTHROID) 137 MCG tablet [Pharmacy Med Name: LEVOTHYROXINE 0.137MG (137MCG) TAB] 90 tablet 0     Sig: TAKE 1 TABLET(137 MCG) BY MOUTH DAILY    Thyroid Protocol Failed    6/4/2018 12:48 PM       Failed - Normal TSH on file in past 12 months    Recent Labs   Lab Test  08/04/17   1228   TSH  0.11*             Passed - Patient is 12 years or older       Passed - Recent (12 mo) or future (30 days) visit within the authorizing provider's specialty    Patient had office visit in the last 12 months or has a visit in the next 30 days with authorizing provider or within the authorizing provider's specialty.  See \"Patient Info\" tab in inbasket, or \"Choose Columns\" in Meds & Orders section of the refill encounter.              Last Written Prescription Date:  12/5/17  Last Fill Quantity: 90,  # refills: 1   Last Office Visit with [facility] or [facility] prescribing provider:  11/30/17   Future Office Visit:       "

## 2018-06-04 NOTE — TELEPHONE ENCOUNTER
Reason for Call:  Medication or medication refill:    Do you use a [facility]?  Name of the pharmacy and phone number for the current request:  [facility] [place]     Name of the medication requested: Levothyroxine     Other request: Pt is upset that there are no more refills.    Can we leave a detailed message on this number? YES    Phone number patient can be reached at: [phone]    Best Time: any     Call taken on 6/4/2018 at 12:45 PM by Dara Delaney

## 2018-06-05 ENCOUNTER — TELEPHONE (OUTPATIENT)
Dept: FAMILY MEDICINE | Facility: OTHER | Age: 63
End: 2018-06-05

## 2018-06-05 DIAGNOSIS — E03.9 ACQUIRED HYPOTHYROIDISM: ICD-10-CM

## 2018-06-05 RX ORDER — LEVOTHYROXINE SODIUM 137 UG/1
TABLET ORAL
Qty: 365 TABLET | Refills: 100 | Status: SHIPPED | OUTPATIENT
Start: 2018-06-05 | End: 2018-06-05

## 2018-06-05 RX ORDER — LEVOTHYROXINE SODIUM 137 UG/1
137 TABLET ORAL DAILY
Qty: 365 TABLET | Refills: 0 | Status: SHIPPED | OUTPATIENT
Start: 2018-06-05 | End: 2020-01-25

## 2018-06-05 NOTE — TELEPHONE ENCOUNTER
I have attempted to call the pt with the following message. I left message for pt to call back. I will call back another time. Aleida Javed CMA ([facility])  f

## 2018-06-05 NOTE — TELEPHONE ENCOUNTER
"Poorly disguised threats on my life noted.  Seriously, we have a 72 hour turnaround and we just received this yesterday.  Adequate refills have been sent to the pharmacy to last him for an extended period of time.  I will still recommend regular, yearly TSH checking.  This is not because I am \"playing God\" but rather it is standard of care.    Jacki continue  "

## 2018-06-05 NOTE — TELEPHONE ENCOUNTER
"Sony calls to check status of medication refill.  He called pharmacy yesterday.  Patient is interruptive and rude on the phone, stating\" Dr Echeverria is trying to play God with his life and that if he (Dr Echeverria) wants to meet God he has no problem with helping with that\" .  Attempted to inform patient of refill policy and Dr Dawson schedule, he interrupts, and makes above statements again.  Attempted again to speak to patient, and he continues to make inappropriate statements, and so disconnected call.   Last TSH was 8/4/17 and was 0.11  "

## 2020-01-01 NOTE — TELEPHONE ENCOUNTER
"Patient called back & message below was relayed to patient.  Patient was clearly upset that his medication has been \"cut off\".  Patient was informed he had not seen Dr Courtney in over a year.  Patient inquired to come in for labs on Friday and that he didn't need to see Dr Courtney.  Patient was advised a message would be sent to the team in inquire for lab orders, patient stated he would just talk to Dr ZUNIGA on Friday & was advised that Dr ZUNIGA is in clinic in the morning and patient was upset as he stated he wakes up at noon.  Patient said I'll catch you later & hung up.  Patient continuously raised his voice and spoke over me multiple times.  Thank you,  Mindy Mcneal  Patient Representative    "
A TSH has been ordered for the patient. It appears that the medication has been refilled for 1 month. Sometime in that month, he should have lab work performed.    Jacki  
No more refills without clinic visit and lab testing  
Patient notified. Peggy TYSON    
Routing refill request to provider for review/approval because:  Labs not current:  TSH.     Jeane Hernandez RN         
Spoke to patient, he has established care, per RM request, with internal med. Patient was seen on 05/15/2017 by Dr. Echeverria for neck and vascular issues. Per OV note he was to schedule a physical in the near future. Infomred patient I can route the refill request to CM but he may still need an office visit to continue with any further refills on his thyroid medication. The patient is refusing appt with provider and requesting to only have labs drawn as he doesn't see a reason why would need to be seen. Please advise   Valery Crater CMA    
levothyroxine (SYNTHROID/LEVOTHROID) 150 MCG tablet     Last Written Prescription Date: 7/10/17  Last Quantity: 30, # refills: 0  Last Office Visit with [facility], [facility] or [facility] prescribing provider: 5/15/17        TSH   Date Value Ref Range Status   04/01/2016 0.59 0.40 - 4.00 mU/L Final       
Statement Selected

## 2020-01-25 ENCOUNTER — APPOINTMENT (OUTPATIENT)
Dept: GENERAL RADIOLOGY | Facility: CLINIC | Age: 65
End: 2020-01-25
Attending: FAMILY MEDICINE
Payer: COMMERCIAL

## 2020-01-25 ENCOUNTER — HOSPITAL ENCOUNTER (OUTPATIENT)
Facility: CLINIC | Age: 65
Setting detail: OBSERVATION
Discharge: HOME | End: 2020-01-26
Attending: FAMILY MEDICINE | Admitting: PEDIATRICS
Payer: COMMERCIAL

## 2020-01-25 DIAGNOSIS — J10.1 INFLUENZA A: ICD-10-CM

## 2020-01-25 DIAGNOSIS — F17.210 CIGARETTE SMOKER: ICD-10-CM

## 2020-01-25 DIAGNOSIS — J44.1 COPD EXACERBATION (H): ICD-10-CM

## 2020-01-25 DIAGNOSIS — I45.10 RIGHT BUNDLE BRANCH BLOCK: ICD-10-CM

## 2020-01-25 LAB
ALBUMIN SERPL-MCNC: 3.9 G/DL (ref 3.4–5)
ALP SERPL-CCNC: 79 U/L (ref 40–150)
ALT SERPL W P-5'-P-CCNC: 52 U/L (ref 0–70)
ANION GAP SERPL CALCULATED.3IONS-SCNC: 8 MMOL/L (ref 3–14)
AST SERPL W P-5'-P-CCNC: 36 U/L (ref 0–45)
BASOPHILS # BLD AUTO: 0.1 10E9/L (ref 0–0.2)
BASOPHILS NFR BLD AUTO: 0.6 %
BILIRUB SERPL-MCNC: 0.5 MG/DL (ref 0.2–1.3)
BUN SERPL-MCNC: 11 MG/DL (ref 7–30)
CALCIUM SERPL-MCNC: 8.4 MG/DL (ref 8.5–10.1)
CHLORIDE SERPL-SCNC: 103 MMOL/L (ref 94–109)
CO2 SERPL-SCNC: 26 MMOL/L (ref 20–32)
CREAT SERPL-MCNC: 0.79 MG/DL (ref 0.66–1.25)
DIFFERENTIAL METHOD BLD: ABNORMAL
EOSINOPHIL NFR BLD AUTO: 0.8 %
ERYTHROCYTE [DISTWIDTH] IN BLOOD BY AUTOMATED COUNT: 15.9 % (ref 10–15)
FLUAV+FLUBV AG SPEC QL: NEGATIVE
FLUAV+FLUBV AG SPEC QL: POSITIVE
GFR SERPL CREATININE-BSD FRML MDRD: >90 ML/MIN/{1.73_M2}
GLUCOSE SERPL-MCNC: 101 MG/DL (ref 70–99)
HCT VFR BLD AUTO: 50.9 % (ref 40–53)
HGB BLD-MCNC: 16.4 G/DL (ref 13.3–17.7)
IMM GRANULOCYTES # BLD: 0.1 10E9/L (ref 0–0.4)
IMM GRANULOCYTES NFR BLD: 1 %
LACTATE BLD-SCNC: 1.5 MMOL/L (ref 0.7–2)
LYMPHOCYTES # BLD AUTO: 0.6 10E9/L (ref 0.8–5.3)
LYMPHOCYTES NFR BLD AUTO: 4.7 %
MCH RBC QN AUTO: 28 PG (ref 26.5–33)
MCHC RBC AUTO-ENTMCNC: 32.2 G/DL (ref 31.5–36.5)
MCV RBC AUTO: 87 FL (ref 78–100)
MONOCYTES # BLD AUTO: 1.2 10E9/L (ref 0–1.3)
MONOCYTES NFR BLD AUTO: 10 %
NEUTROPHILS # BLD AUTO: 9.7 10E9/L (ref 1.6–8.3)
NEUTROPHILS NFR BLD AUTO: 82.9 %
NRBC # BLD AUTO: 0 10*3/UL
NRBC BLD AUTO-RTO: 0 /100
NT-PROBNP SERPL-MCNC: 294 PG/ML (ref 0–900)
PLATELET # BLD AUTO: 247 10E9/L (ref 150–450)
POTASSIUM SERPL-SCNC: 3.9 MMOL/L (ref 3.4–5.3)
PROT SERPL-MCNC: 7.7 G/DL (ref 6.8–8.8)
RBC # BLD AUTO: 5.86 10E12/L (ref 4.4–5.9)
SODIUM SERPL-SCNC: 137 MMOL/L (ref 133–144)
SPECIMEN SOURCE: ABNORMAL
TROPONIN I SERPL-MCNC: <0.015 UG/L (ref 0–0.04)
TSH SERPL DL<=0.005 MIU/L-ACNC: 0.4 MU/L (ref 0.4–4)
WBC # BLD AUTO: 11.7 10E9/L (ref 4–11)

## 2020-01-25 PROCEDURE — 93005 ELECTROCARDIOGRAM TRACING: CPT | Performed by: FAMILY MEDICINE

## 2020-01-25 PROCEDURE — 25800030 ZZH RX IP 258 OP 636: Performed by: FAMILY MEDICINE

## 2020-01-25 PROCEDURE — 96361 HYDRATE IV INFUSION ADD-ON: CPT | Performed by: FAMILY MEDICINE

## 2020-01-25 PROCEDURE — 93010 ELECTROCARDIOGRAM REPORT: CPT | Mod: Z6 | Performed by: FAMILY MEDICINE

## 2020-01-25 PROCEDURE — 71046 X-RAY EXAM CHEST 2 VIEWS: CPT | Mod: TC

## 2020-01-25 PROCEDURE — G0378 HOSPITAL OBSERVATION PER HR: HCPCS

## 2020-01-25 PROCEDURE — 87804 INFLUENZA ASSAY W/OPTIC: CPT | Performed by: FAMILY MEDICINE

## 2020-01-25 PROCEDURE — 83880 ASSAY OF NATRIURETIC PEPTIDE: CPT | Performed by: FAMILY MEDICINE

## 2020-01-25 PROCEDURE — 99207 ZZC CDG-CHARGE REQUIRED MANUAL ENTRY: CPT | Performed by: HOSPITALIST

## 2020-01-25 PROCEDURE — 84443 ASSAY THYROID STIM HORMONE: CPT | Performed by: FAMILY MEDICINE

## 2020-01-25 PROCEDURE — 80053 COMPREHEN METABOLIC PANEL: CPT | Performed by: FAMILY MEDICINE

## 2020-01-25 PROCEDURE — 99285 EMERGENCY DEPT VISIT HI MDM: CPT | Mod: 25 | Performed by: FAMILY MEDICINE

## 2020-01-25 PROCEDURE — 99207 ZZC MOONLIGHTING INDICATOR: CPT | Mod: 59 | Performed by: HOSPITALIST

## 2020-01-25 PROCEDURE — 83605 ASSAY OF LACTIC ACID: CPT | Performed by: FAMILY MEDICINE

## 2020-01-25 PROCEDURE — 25000132 ZZH RX MED GY IP 250 OP 250 PS 637: Performed by: FAMILY MEDICINE

## 2020-01-25 PROCEDURE — 25000128 H RX IP 250 OP 636: Performed by: FAMILY MEDICINE

## 2020-01-25 PROCEDURE — 99219 ZZC INITIAL OBSERVATION CARE,LEVL II: CPT | Performed by: HOSPITALIST

## 2020-01-25 PROCEDURE — 25000125 ZZHC RX 250: Performed by: FAMILY MEDICINE

## 2020-01-25 PROCEDURE — 84484 ASSAY OF TROPONIN QUANT: CPT | Performed by: FAMILY MEDICINE

## 2020-01-25 PROCEDURE — 85025 COMPLETE CBC W/AUTO DIFF WBC: CPT | Performed by: FAMILY MEDICINE

## 2020-01-25 PROCEDURE — 96374 THER/PROPH/DIAG INJ IV PUSH: CPT | Performed by: FAMILY MEDICINE

## 2020-01-25 RX ORDER — ACETAMINOPHEN 325 MG/1
975 TABLET ORAL ONCE
Status: COMPLETED | OUTPATIENT
Start: 2020-01-25 | End: 2020-01-25

## 2020-01-25 RX ORDER — ACETAMINOPHEN 650 MG/1
650 SUPPOSITORY RECTAL EVERY 4 HOURS PRN
Status: DISCONTINUED | OUTPATIENT
Start: 2020-01-25 | End: 2020-01-26 | Stop reason: HOSPADM

## 2020-01-25 RX ORDER — IBUPROFEN 600 MG/1
600 TABLET, FILM COATED ORAL EVERY 6 HOURS PRN
Status: DISCONTINUED | OUTPATIENT
Start: 2020-01-25 | End: 2020-01-26 | Stop reason: HOSPADM

## 2020-01-25 RX ORDER — ATORVASTATIN CALCIUM 20 MG/1
20 TABLET, FILM COATED ORAL DAILY
COMMUNITY
Start: 2020-01-23

## 2020-01-25 RX ORDER — METHYLPREDNISOLONE SODIUM SUCCINATE 40 MG/ML
32 INJECTION, POWDER, LYOPHILIZED, FOR SOLUTION INTRAMUSCULAR; INTRAVENOUS EVERY 24 HOURS
Status: DISCONTINUED | OUTPATIENT
Start: 2020-01-26 | End: 2020-01-26 | Stop reason: HOSPADM

## 2020-01-25 RX ORDER — OSELTAMIVIR PHOSPHATE 75 MG/1
75 CAPSULE ORAL 2 TIMES DAILY
Status: DISCONTINUED | OUTPATIENT
Start: 2020-01-25 | End: 2020-01-26 | Stop reason: HOSPADM

## 2020-01-25 RX ORDER — IPRATROPIUM BROMIDE AND ALBUTEROL SULFATE 2.5; .5 MG/3ML; MG/3ML
3 SOLUTION RESPIRATORY (INHALATION)
Status: DISCONTINUED | OUTPATIENT
Start: 2020-01-25 | End: 2020-01-26 | Stop reason: HOSPADM

## 2020-01-25 RX ORDER — LIDOCAINE 40 MG/G
CREAM TOPICAL
Status: DISCONTINUED | OUTPATIENT
Start: 2020-01-25 | End: 2020-01-26 | Stop reason: HOSPADM

## 2020-01-25 RX ORDER — LEVOTHYROXINE SODIUM 125 UG/1
125 TABLET ORAL DAILY
COMMUNITY
Start: 2019-08-23

## 2020-01-25 RX ORDER — METHYLPREDNISOLONE SODIUM SUCCINATE 125 MG/2ML
60 INJECTION, POWDER, LYOPHILIZED, FOR SOLUTION INTRAMUSCULAR; INTRAVENOUS EVERY 12 HOURS
Status: DISCONTINUED | OUTPATIENT
Start: 2020-01-26 | End: 2020-01-25

## 2020-01-25 RX ORDER — ACETAMINOPHEN 325 MG/1
650 TABLET ORAL EVERY 4 HOURS PRN
Status: DISCONTINUED | OUTPATIENT
Start: 2020-01-25 | End: 2020-01-26 | Stop reason: HOSPADM

## 2020-01-25 RX ORDER — ALBUTEROL SULFATE 0.83 MG/ML
3 SOLUTION RESPIRATORY (INHALATION)
Status: DISCONTINUED | OUTPATIENT
Start: 2020-01-25 | End: 2020-01-26 | Stop reason: HOSPADM

## 2020-01-25 RX ORDER — METHYLPREDNISOLONE SODIUM SUCCINATE 125 MG/2ML
125 INJECTION, POWDER, LYOPHILIZED, FOR SOLUTION INTRAMUSCULAR; INTRAVENOUS ONCE
Status: COMPLETED | OUTPATIENT
Start: 2020-01-25 | End: 2020-01-25

## 2020-01-25 RX ADMIN — IBUPROFEN 600 MG: 600 TABLET ORAL at 18:40

## 2020-01-25 RX ADMIN — ACETAMINOPHEN 975 MG: 325 TABLET, FILM COATED ORAL at 14:58

## 2020-01-25 RX ADMIN — METHYLPREDNISOLONE SODIUM SUCCINATE 125 MG: 125 INJECTION, POWDER, FOR SOLUTION INTRAMUSCULAR; INTRAVENOUS at 17:05

## 2020-01-25 RX ADMIN — GUAIFENESIN 10 ML: 100 SOLUTION ORAL at 18:40

## 2020-01-25 RX ADMIN — IPRATROPIUM BROMIDE AND ALBUTEROL SULFATE 3 ML: .5; 3 SOLUTION RESPIRATORY (INHALATION) at 15:06

## 2020-01-25 RX ADMIN — IPRATROPIUM BROMIDE AND ALBUTEROL SULFATE 3 ML: .5; 3 SOLUTION RESPIRATORY (INHALATION) at 14:47

## 2020-01-25 RX ADMIN — SODIUM CHLORIDE 250 ML: 9 INJECTION, SOLUTION INTRAVENOUS at 16:10

## 2020-01-25 RX ADMIN — IPRATROPIUM BROMIDE AND ALBUTEROL SULFATE 3 ML: .5; 3 SOLUTION RESPIRATORY (INHALATION) at 20:40

## 2020-01-25 RX ADMIN — IPRATROPIUM BROMIDE AND ALBUTEROL SULFATE 3 ML: .5; 3 SOLUTION RESPIRATORY (INHALATION) at 14:56

## 2020-01-25 RX ADMIN — OSELTAMIVIR PHOSPHATE 75 MG: 75 CAPSULE ORAL at 20:41

## 2020-01-25 ASSESSMENT — MIFFLIN-ST. JEOR: SCORE: 1813.65

## 2020-01-25 NOTE — ED NOTES
ED Nursing criteria listed below was addressed during verbal handoff:     Abnormal vitals: Yes  Abnormal results: Yes  Med Reconciliation completed: Yes  Meds given in ED: Yes  Any Overdue Meds: No  Core Measures: No  Isolation: Yes  Special needs: No  Skin assessment: Yes    Observation Patient  Education provided: Yes

## 2020-01-25 NOTE — ED PROVIDER NOTES
History     Chief Complaint   Patient presents with     Shortness of Breath     HPI  Sony Johnson is a 64 year old male who presents with a cough, shortness of breath, body aches that all started last night.  He has had some subjective fevers and chills.  Patient has not noticed any wheezing.  Denies any chest pain or nausea or vomiting.  There are no sick contacts noted at home.  Patient is a smoker but is not diagnosed with COPD or asthma.  Patient did not get a flu vaccine this season.  Patient is also had a runny nose.  Cough is been nonproductive.  Patient feels like his shortness of breath is worse when he is laying flat.    Allergies:  No Known Allergies    Problem List:    Patient Active Problem List    Diagnosis Date Noted     Advanced directives, counseling/discussion 05/15/2017     Priority: Medium     Info given       Hypothyroidism 2015     Priority: Medium     CARDIOVASCULAR SCREENING; LDL GOAL LESS THAN 130 2015     Priority: Medium        Past Medical History:    No past medical history on file.    Past Surgical History:    No past surgical history on file.    Family History:    No family history on file.    Social History:  Marital Status:   [2]  Social History     Tobacco Use     Smoking status: Current Every Day Smoker     Packs/day: 0.25     Years: 44.00     Pack years: 11.00     Types: Cigarettes     Last attempt to quit: 2015     Years since quittin.1     Smokeless tobacco: Never Used     Tobacco comment: started age 16   Substance Use Topics     Alcohol use: No     Alcohol/week: 0.0 standard drinks     Drug use: No        Medications:    albuterol (PROAIR HFA/PROVENTIL HFA/VENTOLIN HFA) 108 (90 BASE) MCG/ACT Inhaler  ASPIRIN PO  benzonatate (TESSALON) 200 MG capsule  levothyroxine (SYNTHROID/LEVOTHROID) 137 MCG tablet          Review of Systems   All other systems reviewed and are negative.      Physical Exam   BP: (!) 172/130  Pulse: 125  Temp: 99.6  F (37.6   C)  Resp: 20  Weight: 104.3 kg (230 lb)  SpO2: 98 %      Physical Exam  Vitals signs and nursing note reviewed.   Constitutional:       General: He is not in acute distress.     Appearance: He is well-developed. He is not diaphoretic.   HENT:      Head: Normocephalic and atraumatic.      Nose: Nose normal.      Mouth/Throat:      Pharynx: No oropharyngeal exudate.   Eyes:      General: No scleral icterus.     Conjunctiva/sclera: Conjunctivae normal.      Pupils: Pupils are equal, round, and reactive to light.   Neck:      Musculoskeletal: Normal range of motion.   Cardiovascular:      Rate and Rhythm: Normal rate and regular rhythm.      Heart sounds: Normal heart sounds. No murmur. No friction rub.   Pulmonary:      Effort: Pulmonary effort is normal. No respiratory distress.      Breath sounds: Wheezing present. No rales.   Abdominal:      General: Bowel sounds are normal. There is no distension.      Palpations: Abdomen is soft. There is no mass.      Tenderness: There is no abdominal tenderness. There is no guarding or rebound.   Musculoskeletal: Normal range of motion.         General: No tenderness.   Skin:     General: Skin is warm.      Findings: No rash.   Neurological:      Mental Status: He is alert and oriented to person, place, and time.   Psychiatric:         Judgment: Judgment normal.         ED Course        Procedures               EKG Interpretation:      Interpreted by Ronny Kendrick MD  Time reviewed: now  Symptoms at time of EKG: None   Rhythm: sinus tachycardia  Rate: Tachycardia  Axis: Normal  Ectopy: none  Conduction: right bundle branch block (complete)  ST Segments/ T Waves: No acute ischemic changes, Non-specific ST-T wave changes and Poor R wave progression  Q Waves: nonspecific  Comparison to prior: No old EKG available    Clinical Impression: right bundle branch block        Results for orders placed or performed during the hospital encounter of 01/25/20 (from the past 24  hour(s))   CBC with platelets differential   Result Value Ref Range    WBC 11.7 (H) 4.0 - 11.0 10e9/L    RBC Count 5.86 4.4 - 5.9 10e12/L    Hemoglobin 16.4 13.3 - 17.7 g/dL    Hematocrit 50.9 40.0 - 53.0 %    MCV 87 78 - 100 fl    MCH 28.0 26.5 - 33.0 pg    MCHC 32.2 31.5 - 36.5 g/dL    RDW 15.9 (H) 10.0 - 15.0 %    Platelet Count 247 150 - 450 10e9/L    Diff Method Automated Method     % Neutrophils 82.9 %    % Lymphocytes 4.7 %    % Monocytes 10.0 %    % Eosinophils 0.8 %    % Basophils 0.6 %    % Immature Granulocytes 1.0 %    Nucleated RBCs 0 0 /100    Absolute Neutrophil 9.7 (H) 1.6 - 8.3 10e9/L    Absolute Lymphocytes 0.6 (L) 0.8 - 5.3 10e9/L    Absolute Monocytes 1.2 0.0 - 1.3 10e9/L    Absolute Basophils 0.1 0.0 - 0.2 10e9/L    Abs Immature Granulocytes 0.1 0 - 0.4 10e9/L    Absolute Nucleated RBC 0.0    Lactic acid whole blood   Result Value Ref Range    Lactic Acid 1.5 0.7 - 2.0 mmol/L   Comprehensive metabolic panel   Result Value Ref Range    Sodium 137 133 - 144 mmol/L    Potassium 3.9 3.4 - 5.3 mmol/L    Chloride 103 94 - 109 mmol/L    Carbon Dioxide 26 20 - 32 mmol/L    Anion Gap 8 3 - 14 mmol/L    Glucose 101 (H) 70 - 99 mg/dL    Urea Nitrogen 11 7 - 30 mg/dL    Creatinine 0.79 0.66 - 1.25 mg/dL    GFR Estimate >90 >60 mL/min/[1.73_m2]    GFR Estimate If Black >90 >60 mL/min/[1.73_m2]    Calcium 8.4 (L) 8.5 - 10.1 mg/dL    Bilirubin Total 0.5 0.2 - 1.3 mg/dL    Albumin 3.9 3.4 - 5.0 g/dL    Protein Total 7.7 6.8 - 8.8 g/dL    Alkaline Phosphatase 79 40 - 150 U/L    ALT 52 0 - 70 U/L    AST 36 0 - 45 U/L   Nt probnp inpatient (BNP)   Result Value Ref Range    N-Terminal Pro BNP Inpatient 294 0 - 900 pg/mL   Troponin I   Result Value Ref Range    Troponin I ES <0.015 0.000 - 0.045 ug/L   TSH with free T4 reflex   Result Value Ref Range    TSH 0.40 0.40 - 4.00 mU/L   Influenza A/B antigen   Result Value Ref Range    Influenza A/B Agn Specimen Nasopharyngeal     Influenza A Positive (A) NEG^Negative     Influenza B Negative NEG^Negative   XR Chest 2 Views    Narrative    CHEST TWO VIEW   1/25/2020 3:45 PM     HISTORY: Cough, shortness of breath.    COMPARISON: Chest x-ray 3/14/2018.      Impression    IMPRESSION: PA and lateral views of the chest. Mild interstitial  changes appear stable. No new infiltrate or lung consolidation. Heart  is normal in size. No effusions are evident. No pneumothorax.    SHAYY HELM MD       Medications   acetaminophen (TYLENOL) tablet 975 mg (has no administration in time range)   ipratropium - albuterol 0.5 mg/2.5 mg/3 mL (DUONEB) neb solution 3 mL (has no administration in time range)     Labs came back and patient was positive for influenza A.  Patient had significant wheezing upon exam.  Her sats were in the low 90s.  Patient states he is never been diagnosed with COPD but he does have a 50-pack-year history of smoking.  We did give him 3 nebs and he feels like his breathing is somewhat better but reexamination shows he still very wheezy and very tight bilaterally.  I am concerned with the positive diagnosis of influenza A, new diagnosis of COPD and continued significant wheezing and decreased air movement, patient will continue to worsen at home.  We discussed coming in for an observation stay which patient is okay with that plan.  I think this is the most prudent course of action.  Patient was given the first dose of Tamiflu here in the emergency department.  Will discuss with the hospitalist about possibly starting steroids also.    Assessments & Plan (with Medical Decision Making)  Influenza A, COPD exacerbation     I have reviewed the nursing notes.    I have reviewed the findings, diagnosis, plan and need for follow up with the patient.        1/25/2020   [facility] EMERGENCY DEPARTMENT     Ronny Kendrick MD  01/25/20 0481

## 2020-01-25 NOTE — ED TRIAGE NOTES
"Presents to ED with increasing shortness of breath and chest pain under the rib cage. Has dry, hacking cough since midnight. States he smoke 2-3 cigarettes a day and he \"can't lay down.\"   "

## 2020-01-25 NOTE — PROGRESS NOTES
S-(situation): Patient registered to Observation. Patient arrived to room 252 via wheelchair from ED.     B-(background): Influenza A; COPD.     A-(assessment): A&Ox4. Tight, unproductive cough. Elevated BP. Sats stable on RA. Tachypnea. Lungs coarse crackles with expiratory wheezes throughout. Skin intact.     R-(recommendations): Orders and observation goals reviewed with patient.     Nursing Observation criteria listed below was met:    Skin issues/needs documented:NA  Isolation needs addressed, if appropriate: Yes  Fall Prevention: Education given and documented: NA  Education Assessment documented:Yes  Education Documented (Pre-existing chronic infection such as, MRSA/VRE need education on admission): Yes  OBS video/handout Reviewed & DocumentedYes  Medication Reconciliation Complete: Yes  New medication patient education completed and documented (Possible Side Effects of Common Medications handout): Yes  Home medications if not able to send immediately home with family stored here: NA  Reminder note placed in discharge instructions: NA  Patient has discharge needs (If yes, please explain): No.

## 2020-01-26 VITALS
TEMPERATURE: 96 F | WEIGHT: 230 LBS | RESPIRATION RATE: 20 BRPM | DIASTOLIC BLOOD PRESSURE: 89 MMHG | HEIGHT: 69 IN | BODY MASS INDEX: 34.07 KG/M2 | HEART RATE: 107 BPM | SYSTOLIC BLOOD PRESSURE: 144 MMHG | OXYGEN SATURATION: 93 %

## 2020-01-26 PROCEDURE — 25000125 ZZHC RX 250: Performed by: FAMILY MEDICINE

## 2020-01-26 PROCEDURE — G0378 HOSPITAL OBSERVATION PER HR: HCPCS

## 2020-01-26 PROCEDURE — 25000132 ZZH RX MED GY IP 250 OP 250 PS 637: Performed by: FAMILY MEDICINE

## 2020-01-26 PROCEDURE — 25000128 H RX IP 250 OP 636: Performed by: HOSPITALIST

## 2020-01-26 PROCEDURE — 96376 TX/PRO/DX INJ SAME DRUG ADON: CPT

## 2020-01-26 PROCEDURE — 99217 ZZC OBSERVATION CARE DISCHARGE: CPT | Performed by: HOSPITALIST

## 2020-01-26 RX ORDER — OSELTAMIVIR PHOSPHATE 75 MG/1
75 CAPSULE ORAL 2 TIMES DAILY
Qty: 8 CAPSULE | Refills: 0 | Status: SHIPPED | OUTPATIENT
Start: 2020-01-26 | End: 2020-01-30

## 2020-01-26 RX ORDER — PREDNISONE 10 MG/1
TABLET ORAL
Qty: 30 TABLET | Refills: 0 | Status: SHIPPED | OUTPATIENT
Start: 2020-01-26

## 2020-01-26 RX ADMIN — METHYLPREDNISOLONE SODIUM SUCCINATE 32 MG: 40 INJECTION, POWDER, FOR SOLUTION INTRAMUSCULAR; INTRAVENOUS at 06:03

## 2020-01-26 RX ADMIN — IPRATROPIUM BROMIDE AND ALBUTEROL SULFATE 3 ML: .5; 3 SOLUTION RESPIRATORY (INHALATION) at 06:02

## 2020-01-26 RX ADMIN — IPRATROPIUM BROMIDE AND ALBUTEROL SULFATE 3 ML: .5; 3 SOLUTION RESPIRATORY (INHALATION) at 01:23

## 2020-01-26 RX ADMIN — OSELTAMIVIR PHOSPHATE 75 MG: 75 CAPSULE ORAL at 08:35

## 2020-01-26 RX ADMIN — IPRATROPIUM BROMIDE AND ALBUTEROL SULFATE 3 ML: .5; 3 SOLUTION RESPIRATORY (INHALATION) at 10:13

## 2020-01-26 NOTE — PLAN OF CARE
"S-(situation): End of shift note    B-(background): Influenza A, COPD    A-(assessment): Vital signs stable. Tachycardic. /65 (BP Location: Left arm)   Pulse 107   Temp 96.4  F (35.8  C) (Oral)   Resp 20   Ht 1.753 m (5' 9\")   Wt 104.3 kg (230 lb)   SpO2 95%   BMI 33.97 kg/m  .  Afebrile. Lung sounds course with expiratory wheeze. Tight dry non productive cough. Has been able to maintain O2 sats on RA this morning. Denies pain at this time. Ambulating independently with SBA. Able to make needs known and uses call light appropriately.     R-(recommendations): Continue to monitor per care plan.    "

## 2020-01-26 NOTE — PROGRESS NOTES
"S-(situation): Patient discharged to home via ambulation with family@ 1130.    B-(background): Observation goals met     A-(assessment): pt A/Ox3. Dry, NPC. Dyspneic with exertion, recovers after resting. Up independently. BP (!) 144/89 (BP Location: Left arm)   Pulse 107   Temp 96  F (35.6  C) (Oral)   Resp 20   Ht 1.753 m (5' 9\")   Wt 104.3 kg (230 lb)   SpO2 93%   BMI 33.97 kg/m      R-(recommendations): Discharge instructions reviewed with pt. Listed belongings gathered and returned to patient.  Patient Education resolved: Yes  New medications-Pt. Has been educated about reason of use and side effects Yes  Home and hospital acquired medications returned to patient NA  Medication Bin checked and emptied on discharge Yes      "

## 2020-01-26 NOTE — H&P
[facility]    History and Physical  Hospitalist       Date of Admission:  1/25/2020    Assessment & Plan   Sony Johnson is a 64 year old male with a medical history as documented below; admitted into the hospital with cough, shortness of breath as well as body aches over the last 1 day    #. Influenza RTI       ?AECOPD  Based on clinical presentation and lab testing.   He has significant wheezing on physical examination but no objective evidence or prior diagnosis of COPD. He does have a significant smoking history, with last echocardiogram from 3/2015 noting mild pulmonary hypertension so he likely has chronic lung disease.   He notes orthopnea, but has no other clinical sign/symptom, so he does not meet [place] criteria for CHF. BNP is normal and CXR with no significant pulmonary edema.   Will manage influenza and trteat as AECOPD.   -- Continue Tamiflu 75 mg BID  -- Duoneb q4H scheduled  -- Prednisone 40 mg QDAY for 5 days (REDUCE trial) - IV if not tolerating PO  -- Hold off on antibiotics for now - no productive sputum  -- PRN oxygen supplementation  -- NIPPV as needed  -- Maintain SpO2 between 88 and 92%  -- Will need PFTs as an outpatient  -- Smoking cessation    #. Hypothyroidism  Known history of hyperthyroidism (toxic nodule) s/p ablation with iatrogenic hypothyroidism.   Last TSH 0.40 (1/25/2020)  Stable on examination  -- Continue home levothyroxine   -- Monitor closely     #. Hyperlipidemia:  Continue Lipitor      DVT Prophylaxis: Pneumatic Compression Devices  Code Status: Full Code  Expected discharge: 2 - 3 days, recommended to prior living arrangement once O2 use less than 1 liters/minute.    Krissy Willis MD    Primary Care Physician     [facility]    Chief Complaint   Dyspnea, cough    History is obtained from the patient    History of Present Illness   Sony Johnson is a 64 year old male with a medical history as documented below;  admitted into the hospital with cough, shortness of breath as well as body aches over the last 1 day.  Patient developed nonproductive cough, shortness of breath especially when in bed.  Did not have any chest pain nausea or vomiting, and has not had any difficulty swallowing.  He has noted some subjective fevers, but denies any sick contacts, recent travel or history of heart failure.  Patient has an 11-pack-year history of smoking, but he has not had a diagnosis of COPD.     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 Taking?   ASPIRIN PO 1/25/2020 at 0900  Yes Yes   Sig: Take 81 mg by mouth daily   albuterol (PROAIR HFA/PROVENTIL HFA/VENTOLIN HFA) 108 (90 BASE) MCG/ACT Inhaler   No No   Sig: Inhale 2 puffs into the lungs every 6 hours for 10 days   atorvastatin (LIPITOR) 20 MG tablet 1/21/2020 at 0900  Yes Yes   Sig: Take 20 mg by mouth daily   levothyroxine (SYNTHROID/LEVOTHROID) 125 MCG tablet 1/25/2020 at 0900  Yes Yes   Sig: Take 125 mcg by mouth daily      Facility-Administered Medications: None     Allergies   No Known Allergies    Social History   I have reviewed this patient's social history and updated it with pertinent information if needed. Sony Johnson  reports that he has been smoking cigarettes. He has a 11.00 pack-year smoking history. He has never used smokeless tobacco. He reports that he  does not drink alcohol or use drugs.    Family History     Cancer Father     Thyroid Disease Mother   Graves     Diabetes Sister   Obese     Hypertension Brother     Review of Systems   The 10 point Review of Systems is negative other than noted in the HPI or here.      Physical Exam   Temp: 98.8  F (37.1  C) Temp src: Oral BP: (!) 142/78 Pulse: 98   Resp: 24 SpO2: 98 % O2 Device: None (Room air)    Vital Signs with Ranges  Temp:  [98.8  F (37.1  C)-99.6  F (37.6  C)] 98.8  F (37.1  C)  Pulse:  [] 98  Resp:  [20-24] 24  BP: (116-172)/() 142/78  SpO2:  [97 %-98 %] 98 %  230 lbs 0 oz    Constitutional: Afebrile, in mild respiratory distress  Eyes: EOMI  HEENT: Neck is supple  Respiratory: Bilateral global wheezing, no crackles  Cardiovascular: Normal rate and rhythm  GI: Obese abdomen, nontender, nondistended  Lymph/Hematologic: NAD  Genitourinary: No Griffin catheter  Skin: No new rashes  Musculoskeletal: Bilateral pedal edema  Neurologic: Nonfocal neurological deficits   Psychiatric: stable mood    Data   Data reviewed today:     Recent Labs   Lab 01/25/20  1448   WBC 11.7*   HGB 16.4   MCV 87         POTASSIUM 3.9   CHLORIDE 103   CO2 26   BUN 11   CR 0.79   ANIONGAP 8   JACINTO 8.4*   *   ALBUMIN 3.9   PROTTOTAL 7.7   BILITOTAL 0.5   ALKPHOS 79   ALT 52   AST 36   TROPI <0.015       Recent Results (from the past 24 hour(s))   XR Chest 2 Views    Narrative    CHEST TWO VIEW   1/25/2020 3:45 PM     HISTORY: Cough, shortness of breath.    COMPARISON: Chest x-ray 3/14/2018.      Impression    IMPRESSION: PA and lateral views of the chest. Mild interstitial  changes appear stable. No new infiltrate or lung consolidation. Heart  is normal in size. No effusions are evident. No pneumothorax.    SHAYY HELM MD

## 2020-01-26 NOTE — DISCHARGE SUMMARY
[facility]  Hospitalist Discharge Summary       Date of Admission:  1/25/2020  Date of Discharge:  1/26/2020 11:35 AM  Discharging Provider: Savanna Neri MD      Discharge Diagnoses   Sony Johnson is a 64 year old male with a medical history as documented below; admitted into the hospital with cough, shortness of breath as well as body aches over the last 1 day     #. Influenza RTI       ?AECOPD  Based on clinical presentation and lab testing.   He has significant wheezing on physical examination but no objective evidence or prior diagnosis of COPD. He does have a significant smoking history, with last echocardiogram from 3/2015 noting mild pulmonary hypertension so he likely has chronic lung disease.   He notes orthopnea, but has no other clinical sign/symptom, BNP is normal and CXR with no significant pulmonary edema.   He was started on Tamiflu and duo nebs every 4 hours.  He was also started on prednisone.  Oxygen as needed but his oxygen saturation stayed above 92% and did not did not need to be on oxygen requirement.  He does not have any diagnosis of COPD but with history of smoking it is more likely and he needs pulmonary function testing as outpatient.  Patient improved very well with above treatment the patient is stable to be discharged home.  I ordered Tamiflu for 4 more days and kept him on prednisone taper and also ordered Combivent inhaler for him.       #. Hypothyroidism  Known history of hyperthyroidism (toxic nodule) s/p ablation with iatrogenic hypothyroidism.   Last TSH 0.40 (1/25/2020)  Stable on examination  -- Continue home levothyroxine   -- Monitor closely      #. Hyperlipidemia:  Continue Lipitor         Follow-ups Needed After Discharge   Follow-up Appointments     Follow-up and recommended labs and tests       Follow up with primary care provider, Aretha Hendrickson, within 7   days for hospital follow- up.  No follow up labs or test are  needed.               Discharge Disposition   Discharged to home  Condition at discharge: Stable    Hospital Course      Sony Johnson is a 64 year old male with a medical history as documented below; admitted into the hospital with cough, shortness of breath as well as body aches over the last 1 day.  Patient developed nonproductive cough, shortness of breath especially when in bed.  Did not have any chest pain nausea or vomiting, and has not had any difficulty swallowing.  He has noted some subjective fevers, but denies any sick contacts, recent travel or history of heart failure.  Patient has an 11-pack-year history of smoking, but he has not had a diagnosis of COPD.   Improved after getting started on Tamiflu, duo nebs and prednisone.  He did not need to be on oxygen.  Patient is stable to be discharged home.       Consultations This Hospital Stay   None    Code Status   Full Code    Time Spent on this Encounter   I, Savanna Neri MD, personally saw the patient today and spent greater than 30 minutes discharging this patient.       Savanna Neri MD  [facility]  ______________________________________________________________________    Physical Exam   Vital Signs: Temp: 96.5  F (35.8  C) Temp src: Oral BP: 139/81 Pulse: 107 Heart Rate: 114 Resp: 20 SpO2: 93 % O2 Device: None (Room air) Oxygen Delivery: 2 LPM  Weight: 230 lbs 0 oz  Constitutional: awake, alert, cooperative, no apparent distress, and appears stated age  Eyes: Lids and lashes normal, pupils equal, round and reactive to light, extra ocular muscles intact, sclera clear, conjunctiva normal  ENT: Normocephalic, without obvious abnormality, atraumatic, sinuses nontender on palpation, external ears without lesions, oral pharynx with moist mucous membranes, tonsils without erythema or exudates, gums normal and good dentition.  Respiratory: No increased work of breathing, good air exchange, clear to auscultation  bilaterally, no crackles or wheezing  Cardiovascular: Normal apical impulse, regular rate and rhythm, normal S1 and S2, no S3 or S4, and no murmur noted  GI: No scars, normal bowel sounds, soft, non-distended, non-tender, no masses palpated, no hepatosplenomegally       Primary Care Physician   Aretha Whitney [facility]    Discharge Orders      Reason for your hospital stay    Influenza A, COPD     Follow-up and recommended labs and tests     Follow up with primary care provider, Aretha Hendrickson, within 7 days for hospital follow- up.  No follow up labs or test are needed.     Activity    Your activity upon discharge: activity as tolerated     Full Code     Diet    Follow this diet upon discharge: Orders Placed This Encounter      Regular Diet Adult       Significant Results and Procedures   Most Recent 3 CBC's:  Recent Labs   Lab Test 01/25/20  1448 09/10/15  1358   WBC 11.7* 10.8   HGB 16.4 15.8   MCV 87 85    264     Most Recent 3 BMP's:  Recent Labs   Lab Test 01/25/20  1448 10/23/15 09/10/15  1358 03/24/15 08/13/13    141 141  --  140   POTASSIUM 3.9 4.2 4.0 4.0 4.1   CHLORIDE 103 105 108  --  107   CO2 26  --  27  --   --    BUN 11 9 14  --  12   CR 0.79 0.77 0.82 0.78 0.87   ANIONGAP 8  --  6  --  13   JACINTO 8.4* 8.9 8.6  --  9.9   *  --  116* 85 109*   ,   Results for orders placed or performed during the hospital encounter of 01/25/20   XR Chest 2 Views    Narrative    CHEST TWO VIEW   1/25/2020 3:45 PM     HISTORY: Cough, shortness of breath.    COMPARISON: Chest x-ray 3/14/2018.      Impression    IMPRESSION: PA and lateral views of the chest. Mild interstitial  changes appear stable. No new infiltrate or lung consolidation. Heart  is normal in size. No effusions are evident. No pneumothorax.    SHAYY HELM MD       Discharge Medications   Current Discharge Medication List      START taking these medications    Details   Ipratropium-Albuterol (COMBIVENT RESPIMAT)  MCG/ACT  inhaler Inhale 1 puff into the lungs 4 times daily  Qty: 1 Inhaler, Refills: 0    Associated Diagnoses: COPD exacerbation (H)      oseltamivir (TAMIFLU) 75 MG capsule Take 1 capsule (75 mg) by mouth 2 times daily for 4 days  Qty: 8 capsule, Refills: 0    Associated Diagnoses: Influenza A      predniSONE (DELTASONE) 10 MG tablet Take 4 tab daily for 3 days then 3 tab daily for 3 days then 2 tab daily for 3 days then 1 tab daily for 3 days  Qty: 30 tablet, Refills: 0    Associated Diagnoses: COPD exacerbation (H)         CONTINUE these medications which have NOT CHANGED    Details   ASPIRIN PO Take 81 mg by mouth daily      atorvastatin (LIPITOR) 20 MG tablet Take 20 mg by mouth daily      levothyroxine (SYNTHROID/LEVOTHROID) 125 MCG tablet Take 125 mcg by mouth daily      albuterol (PROAIR HFA/PROVENTIL HFA/VENTOLIN HFA) 108 (90 BASE) MCG/ACT Inhaler Inhale 2 puffs into the lungs every 6 hours for 10 days  Qty: 1 Inhaler, Refills: 0           Allergies   No Known Allergies

## 2020-01-27 ENCOUNTER — TELEPHONE (OUTPATIENT)
Dept: FAMILY MEDICINE | Facility: CLINIC | Age: 65
End: 2020-01-27

## 2020-01-27 NOTE — TELEPHONE ENCOUNTER
"  ED for acute condition Discharge Protocol    \"Hi, my name is Patricia Sullivan RN, a registered nurse, and I am calling from [facility].  I am calling to follow up and see how things are going for you after your recent emergency visit.\"    Tell me how you are doing now that you are home?\" I am feeling better, to that is good.       Discharge Instructions    \"Let's review your discharge instructions.  What is/are the follow-up recommendations?  Pt. Response: HE is taking TamiFlu, Prednisone, and Combivent Inhaler.    \"Has an appointment with your primary care provider been scheduled?\"  He has a provider as go to [facility] in [place].    Medications    \"Tell me what changed about your medicines when you discharged?\"    NO changes to meds.     \"What questions do you have about your medications?\"   None     Call Summary    \"What questions or concerns do you have about your recent visit and your follow-up care?\"     none    \"If you have questions or things don't continue to improve, we encourage you contact us through the main clinic number (give number).  Even if the clinic is not open, triage nurses are available 24/7 to help you.     We would like you to know that our clinic has extended hours (provide information).  We also have urgent care (provide details on closest location and hours/contact info)\"    \"Thank you for your time and take care!\"                "

## 2020-01-27 NOTE — TELEPHONE ENCOUNTER
Patient called to schedule an appointment for a hospital follow-up or appeared on a report showing that they were recently discharged from the hospital.    Patient was admitted to [facility]:    Discharged date: 1/26/20  Reason for hospital admission:  Influenza A  Does patient have future appointment scheduled with provider? No  Date of future appointment:        This information will be used to help the care team plan for the patients upcoming visit.  The triage RN may determine that a follow up call is necessary and reach out to the patient via the phone number listed in the chart.     Please route this message on routine priority to the Triage RN pool.

## 2024-06-17 PROBLEM — Z71.89 ADVANCED DIRECTIVES, COUNSELING/DISCUSSION: Status: RESOLVED | Noted: 2017-05-15 | Resolved: 2024-06-17
